# Patient Record
Sex: MALE | Race: WHITE | NOT HISPANIC OR LATINO | URBAN - METROPOLITAN AREA
[De-identification: names, ages, dates, MRNs, and addresses within clinical notes are randomized per-mention and may not be internally consistent; named-entity substitution may affect disease eponyms.]

---
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
## 2023-03-14 NOTE — ASU DISCHARGE PLAN (ADULT/PEDIATRIC) - A. DRIVE A CAR, OPERATE POWER TOOLS OR MACHINERY
POSTOPERATIVE INSTRUCTIONS following KNEE SURGERY    MEDICATIONS:  Resume all home medications unless otherwise instructed by your surgeon  Pain Medication:  Oxycodone 5 mg, 1-3 tablets every 3 hours as needed  If you were given a regional anesthetic (nerve block), please begin taking the pain medication as soon as you get home, even if you have minimal or no pain  DO NOT WAIT FOR THE NERVE BLOCK TO WEAR OFF  Possible side effects include nausea, constipation, and urinary retention  If you experience these side effects, please call our office for assistance  Pain med refills are authorized only during office hours (8am-4pm, Mon-Fri)  Anti-Inflammatory:  Resume your home anti-inflammatory medication  Take with food  Stop if you experience nausea, reflux, or stomach pain  Nausea Medication:  Zofran ODT 4 mg, 1 tablet every 6 hours as needed  Fill prescription ONLY if you expericnce severe nausea  Blood Clot Prevention:  Aspirin 81 mg, 1 tablet twice daily for 30 days  Pump your foot up and down 20 times per hour while you are less mobile  WOUND CARE:  Keep the dressing clean and dry  Light drainage may occur the first 2 days postop  You may remove the dressings and get the incision wet in the shower 72 hours after surgery  DO NOT remove steri-strips or sutures  DO NOT immerse the incision under water  Carefully pat the incision dry  If there is wound drainage, re-apply a fresh dry gauze dressing  Please call our office ([phone]) if you experience either of the following:  Sudden increase in swelling, redness, or warmth at the surgical site  Excessive incisional drainage that persists beyond the 3rd day after surgery  Oral temperature greater than 101 degrees, not relieved with Tylenol  Shortness of breath, chest pain, nausea, or any other concerning symptoms    SWELLING CONTROL:  Cold Therapy:   The cold therapy device may be used either continuously or only as needed, according to your preference  Do not let the pad directly touch your skin  Alternatively, apply ice (20 min on, 20 min off) as often as you feel is necessary  Elevation:  Elevate the entire leg above heart level  Place pillows under your ankle to keep your knee straight  Compression:  Apply ACE wraps or a thigh-length compression stocking as needed  RANGE OF MOTION:  You are allowed FULL RANGE OF MOTION as tolerated  IMMOBILIZATION:  None  You are allowed full range of motion as tolerated  ACTIVITY:   BEAR FULL WEIGHT AS TOLERATED on the operative leg  Use crutches to assist only as needed  Using Crutches on Stairs:  Going up, lead with your "good" (nonoperative) leg  Going down, lead with your "bad" (operative) leg  Use a hand rail when available  Knee Extension:  Place a rolled towel or pillow under your ankle for 20-30 minutes 3-5 times per day  This will help to maintain full knee extension  Quad Sets:  Sit or lie with your knee straight  Tighten your quadriceps (front thigh) muscle  Hold for 3 seconds, then relax  Repeat 20 times per hour while awake  PHYSICAL THERAPY:  You will be given a physical therapy prescription when you are seen in the office for your postoperative appointment  FOLLOW-UP APPOINTMENT:  4-5 days after surgery with:    Dr Wayne Chavez, [address] [facility]  [address] [place] [address], [address] [place] [address], [place], [address] [facility]  [phone] ([facility])  [phone] (After-Hours) Statement Selected

## 2023-05-16 NOTE — ASU DISCHARGE PLAN (ADULT/PEDIATRIC) - CALL YOUR DOCTOR IF YOU HAVE ANY OF THE FOLLOWING:
Wound/Surgical Site with redness, or foul smelling discharge or pus/Numbness, tingling, color or temperature change to extremity/Swelling that gets worse/Fever greater than (need to indicate Fahrenheit or Celsius)/Bleeding that does not stop None